# Patient Record
Sex: MALE | Race: WHITE
[De-identification: names, ages, dates, MRNs, and addresses within clinical notes are randomized per-mention and may not be internally consistent; named-entity substitution may affect disease eponyms.]

---

## 2020-10-10 ENCOUNTER — HOSPITAL ENCOUNTER (EMERGENCY)
Dept: HOSPITAL 53 - M ED | Age: 25
Discharge: HOME | End: 2020-10-10
Payer: COMMERCIAL

## 2020-10-10 VITALS — WEIGHT: 194.45 LBS | HEIGHT: 73 IN | BODY MASS INDEX: 25.77 KG/M2

## 2020-10-10 VITALS — DIASTOLIC BLOOD PRESSURE: 88 MMHG | SYSTOLIC BLOOD PRESSURE: 150 MMHG
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
## 2020-10-10 DIAGNOSIS — S02.601A: Primary | ICD-10-CM
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
## 2021-07-15 ENCOUNTER — HOSPITAL ENCOUNTER (EMERGENCY)
Dept: HOSPITAL 53 - M ED | Age: 26
Discharge: HOME | End: 2021-07-15
Payer: COMMERCIAL

## 2021-07-15 VITALS — SYSTOLIC BLOOD PRESSURE: 128 MMHG | DIASTOLIC BLOOD PRESSURE: 85 MMHG

## 2021-07-15 VITALS — HEIGHT: 73 IN | WEIGHT: 214.51 LBS | BODY MASS INDEX: 28.43 KG/M2
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
## 2021-07-15 DIAGNOSIS — S13.4XXA: Primary | ICD-10-CM
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
## 2021-07-15 DIAGNOSIS — S16.1XXA: ICD-10-CM
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
